# Patient Record
Sex: FEMALE | Race: WHITE | URBAN - METROPOLITAN AREA
[De-identification: names, ages, dates, MRNs, and addresses within clinical notes are randomized per-mention and may not be internally consistent; named-entity substitution may affect disease eponyms.]

---

## 2018-01-31 ENCOUNTER — HISTORICAL (OUTPATIENT)
Dept: ADMINISTRATIVE | Facility: HOSPITAL | Age: 53
End: 2018-01-31

## 2019-01-03 LAB
BUN SERPL-MCNC: 18 MG/DL (ref 7–18)
CALCIUM SERPL-MCNC: 9 MG/DL (ref 8.5–10.1)
CHLORIDE SERPL-SCNC: 104 MMOL/L (ref 98–107)
CO2 SERPL-SCNC: 31 MMOL/L (ref 21–32)
CREAT SERPL-MCNC: 1.19 MG/DL (ref 0.55–1.02)
CREAT/UREA NIT SERPL: 15.1
ERYTHROCYTE [DISTWIDTH] IN BLOOD BY AUTOMATED COUNT: 14 % (ref 11.5–17)
GLUCOSE SERPL-MCNC: 82 MG/DL (ref 74–106)
HCT VFR BLD AUTO: 38.3 % (ref 37–47)
HGB BLD-MCNC: 11.5 GM/DL (ref 12–16)
MCH RBC QN AUTO: 27.8 PG (ref 27–31)
MCHC RBC AUTO-ENTMCNC: 30 GM/DL (ref 33–36)
MCV RBC AUTO: 92.5 FL (ref 80–94)
PLATELET # BLD AUTO: 237 X10(3)/MCL (ref 130–400)
PMV BLD AUTO: 10.1 FL (ref 9.4–12.4)
POTASSIUM SERPL-SCNC: 3.9 MMOL/L (ref 3.5–5.1)
RBC # BLD AUTO: 4.14 X10(6)/MCL (ref 4.2–5.4)
SODIUM SERPL-SCNC: 140 MMOL/L (ref 136–145)
WBC # SPEC AUTO: 6.4 X10(3)/MCL (ref 4.5–11.5)

## 2019-01-11 ENCOUNTER — HOSPITAL ENCOUNTER (OUTPATIENT)
Dept: MEDSURG UNIT | Facility: HOSPITAL | Age: 54
End: 2019-01-12
Attending: OTOLARYNGOLOGY | Admitting: OTOLARYNGOLOGY

## 2022-04-10 ENCOUNTER — HISTORICAL (OUTPATIENT)
Dept: ADMINISTRATIVE | Facility: HOSPITAL | Age: 57
End: 2022-04-10

## 2022-04-29 VITALS
BODY MASS INDEX: 52.33 KG/M2 | SYSTOLIC BLOOD PRESSURE: 135 MMHG | WEIGHT: 266.56 LBS | HEIGHT: 60 IN | DIASTOLIC BLOOD PRESSURE: 89 MMHG

## 2022-04-30 NOTE — H&P
PREOPERATIVE DIAGNOSIS:  Thyroid carcinoma.       Andi Ernandez is a 53-year-old lady who has a history of feeling a lump on the right side of her neck approximately 6 months ago.  It has been growing very slightly since then with essentially a normal TSH and free T4.  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    PAST SURGICAL HISTORY:  Bilateral tubal ligation,  x2, hysterectomy, and cholecystectomy.    ALLERGIES:  Penicillin and Lortab.    SOCIAL HISTORY:  She is a nonsmoker, nondrinker.    PAST MEDICAL HISTORY:  Sleep apnea, CPAP, hypertension, and obesity.    REVIEW OF SYSTEMS:  CARDIOVASCULAR:  No chest pain or circulation problems.  PULMONARY:  No shortness of breath, cough.   GI:  Good appetite.    MEDICATIONS:    1. Fluticasone.   2. Labetalol.   3. Aspirin.   4. Oxycodone.   5. Calcium.   6. Pravastatin.   7. Losartan.   8. Clonazepam.   9. Mirtazapine.   10. Hydralazine.   11. Cetirizine.   12. Pantoprazole.   13. Diclofenac.   14. Furosemide.    PHYSICAL EXAM:  ORAL CAVITY, OROPHARYNX:  Normal mucosal.     EYES:  Extraocular motion intact.  Voice is slightly raspy and rough.     EYES:  Extraocular motion intact.     LUNGS:  Clear to auscultation bilaterally.  Right thyroid shows a 3 cm nodule palpable as part of the thyroid on the inferior pole.    ASSESSMENT:  Thyroid nodule, possible carcinoma.    PLAN:  Thyroid lobectomy, possible total thyroidectomy depending on the frozen section results.  We discussed risks, benefits, and alternatives at length in layman's terms.  We discussed the rare but serious things that could occur as well as more common complications and expectations.  Consent obtained.  All questions answered.  Proceed on .        ______________________________  Wander Rizzo MD    CG/UD  DD:  2019  Time:  06:12AM  DT:  2019  Time:  08:22AM  Job #:  [id number]    The H&P was reviewed, the patient was examined,  and the following changes to the patients condition are noted:  ______________________________________________________________________________  ______________________________________________________________________________  ______________________________________________________________________________  [  ] No changes to the patient's condition:      ______________________________                                             ___________________  PHYSICIAN SIGNATURE                                                             DATE/TIME

## 2022-04-30 NOTE — DISCHARGE SUMMARY
Patient:   Andi Ernandez             MRN: [id number]            FIN: [id number]               Age:   53 years     Sex:  Female     :  1965   Associated Diagnoses:   None   Author:   Wander Rzizo MD      only very mild pain    tolerating po    no oxygen destauration overnight    neck no hematoma  Voice slightly rough  not breathy    Doing well   DC to home  RTC 1 week

## 2022-04-30 NOTE — OP NOTE
DATE OF SURGERY:    01/11/2019    SURGEON:  Wander Rizzo MD    PREOP DIAGNOSIS:  Thyroid carcinoma.    POSTOP DIAGNOSIS:  Necrotic thyroid nodule, possible carcinoma.    ANESTHESIA:  General endotracheal.    ESTIMATED BLOOD LOSS:  73 mL.    INTRAOPERATIVE MEDICATIONS:  Clindamycin 900 mg.    PROCEDURE PERFORMED:  Right thyroid lobectomy.    INDICATION:  Ms. Andi Ernandez is a 53-year-old lady who has a history of above-mentioned problem.  She understood the risks, benefits, and alternatives to procedure and consented to it.    DESCRIPTION OF PROCEDURE:  She was brought to the operating room on 01/11.  Anesthesia was induced in standard fashion with no complication.  The correct patient, procedure and site were identified, confirmed with OR personnel.  The operative site was inspected, prepped and draped in usual sterile fashion and injected with 1% lidocaine with epinephrine.  The 15 blade used to make incision in the standard necklace location and the platysma flap was elevated superiorly, inferiorly.  The vertical strap muscles were retracted laterally out of the way and both thyroid beds were exposed.  The entire thyroid was palpated.  There was a very firm calcified nodule right mid thyroid lobe encompassing most of the right thyroid gland.  The inferior and superior poles were dissected free after successful identification of facial nerve and confirmation by testing on electronic monitoring.  The nerve was intact and functioning at the end of the case.  The specimen was then removed from the anterior surface of the thyroid and removed from its attachment to the contralateral side.  The wound was then irrigated free.  Frozen section came back negative for tumor malignancy and the lesion seemed to be well encapsulated grossly with normal thyroid in the isthmus.       The wound was then closed with deep 2-0 Vicryl suture and superficial subcuticular 5-0 Monocryl in the standard planes.   Steri-Strips were applied.  The patient was awakened and brought to recovery in good condition.        ______________________________  MD PATRICIA Johns/SANTOS  DD:  01/12/2019  Time:  11:02AM  DT:  01/12/2019  Time:  01:32PM  Job #:  [id number]